# Patient Record
(demographics unavailable — no encounter records)

---

## 2025-05-09 NOTE — PHYSICAL EXAM
[MA] : MA [Appropriately responsive] : appropriately responsive [Alert] : alert [No Acute Distress] : no acute distress [Soft] : soft [Non-tender] : non-tender [Non-distended] : non-distended [Oriented x3] : oriented x3 [Examination Of The Breasts] : a normal appearance [No Masses] : no breast masses were palpable [Labia Majora] : normal [Labia Minora] : normal [Normal] : normal [Uterine Adnexae] : normal [FreeTextEntry2] : Maria Isabel [Discharge] : a  ~M vaginal discharge was present [Scant] : scant [White] : white [Cheesy] : cheesy

## 2025-05-09 NOTE — HISTORY OF PRESENT ILLNESS
[TextBox_4] : 32yo presents for annual exam first visit to gyn menarche 12yo x q28d x 3-4d  - normal flow, gets bad cramps - sometimes takes otc pain relief  sexually active with male partner - does not use condoms - not trying to conceive  states has been getting infections down there - thinks because of tight underwear - sometimes wears none or cotton and it still comes.  Has odor and feels itching/burning.  Hasnt taken anything for it.